# Patient Record
Sex: MALE | Employment: STUDENT | URBAN - METROPOLITAN AREA
[De-identification: names, ages, dates, MRNs, and addresses within clinical notes are randomized per-mention and may not be internally consistent; named-entity substitution may affect disease eponyms.]

---
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
## 2022-07-26 ENCOUNTER — OFFICE VISIT (OUTPATIENT)
Dept: URGENT CARE | Age: 22
End: 2022-07-26

## 2022-11-16 ENCOUNTER — TELEPHONE (OUTPATIENT)
Dept: PEDIATRICS CLINIC | Facility: CLINIC | Age: 22
End: 2022-11-16

## 2022-11-23 NOTE — TELEPHONE ENCOUNTER
11/23/22 11:10 AM        The office's request has been received, reviewed, and the patient chart updated  The PCP has successfully been removed with a patient attribution note  This message will now be completed          Thank you  Dez Ambrose

## 2023-01-03 ENCOUNTER — HOSPITAL ENCOUNTER (EMERGENCY)
Facility: HOSPITAL | Age: 23
Discharge: HOME | End: 2023-01-03
Attending: EMERGENCY MEDICINE

## 2023-01-03 VITALS
OXYGEN SATURATION: 97 % | HEART RATE: 105 BPM | SYSTOLIC BLOOD PRESSURE: 164 MMHG | TEMPERATURE: 98.3 F | RESPIRATION RATE: 17 BRPM | DIASTOLIC BLOOD PRESSURE: 92 MMHG

## 2023-01-03 DIAGNOSIS — J02.9 SORE THROAT: ICD-10-CM

## 2023-01-03 DIAGNOSIS — R19.7 DIARRHEA: ICD-10-CM

## 2023-01-03 DIAGNOSIS — B34.9 VIRAL SYNDROME: Primary | ICD-10-CM

## 2023-01-03 LAB
FLUAV RNA RESP QL NAA+PROBE: NEGATIVE
FLUBV RNA RESP QL NAA+PROBE: NEGATIVE
RSV RNA RESP QL NAA+PROBE: NEGATIVE
SARS-COV-2 RNA RESP QL NAA+PROBE: NEGATIVE

## 2023-01-03 RX ORDER — ACETAMINOPHEN 325 MG/1
650 TABLET ORAL ONCE
Status: COMPLETED | OUTPATIENT
Start: 2023-01-03 | End: 2023-01-03

## 2023-01-03 RX ORDER — IBUPROFEN 600 MG/1
600 TABLET ORAL ONCE
Status: COMPLETED | OUTPATIENT
Start: 2023-01-03 | End: 2023-01-03

## 2023-01-03 RX ADMIN — IBUPROFEN 600 MG: 600 TABLET, FILM COATED ORAL at 22:40

## 2023-01-03 RX ADMIN — ACETAMINOPHEN 650 MG: 325 TABLET, FILM COATED ORAL at 22:40

## 2023-01-04 NOTE — DISCHARGE INSTRUCTIONS
You were seen in the emergency department for symptoms consistent with a viral infection  Your flu/COVID/RSV test was negative  Take Tylenol and Motrin for your symptoms  Schedule an appointment to follow-up with a primary care provider  If you have worsening symptoms, are unable to swallow, or become short of breath please return to the emergency department

## 2023-01-04 NOTE — ED PROVIDER NOTES
History  Chief Complaint   Patient presents with   • Sore Throat     Pt reports sore throat and lightheadedness when he woke up at 1300  Pt  Reports nausea and diarrhea  Pt reports taking tylenol around 65       82-year-old male with no significant past medical history who presents with sore throat, sinus congestion, and diarrhea that began when he woke up around 1300 today  Patient states that he has pain with swallowing but has been able to swallow and has been eating and drinking  Patient states that he has has mild nausea but has not vomited  The patient denies any blood in his stool  The patient states that he has felt warm but did not have a fever when he checked at home  The patient also complains of a dull right-sided headache  He states that he is not aware of any recent sick contacts  The patient states that he took Tylenol around 4 PM, but has not taken anything since  The patient denies ear pain, cough, chest pain, shortness of breath, abdominal pain  Prior to Admission Medications   Prescriptions Last Dose Informant Patient Reported? Taking? Cholecalciferol (D 1000) 25 MCG (1000 UT) tablet   No No   Sig: Take 1 tablet (1,000 Units total) by mouth daily   PREVIDENT 5000 BOOSTER PLUS 1 1 % PSTE   Yes No   atomoxetine (STRATTERA) 10 MG capsule   Yes No   fluticasone (FLONASE) 50 mcg/act nasal spray   No No   Si spray into each nostril daily   loratadine (CLARITIN) 10 mg tablet   No No   Sig: Take 1 tablet (10 mg total) by mouth daily      Facility-Administered Medications: None       History reviewed  No pertinent past medical history  Past Surgical History:   Procedure Laterality Date   • DENTAL SURGERY         Family History   Problem Relation Age of Onset   • Lupus Mother    • Leukemia Mother    • Heart murmur Mother    • Panic disorder Father      I have reviewed and agree with the history as documented      E-Cigarette/Vaping     E-Cigarette/Vaping Substances     Social History     Tobacco Use   • Smoking status: Never   • Smokeless tobacco: Never   Substance Use Topics   • Alcohol use: Yes     Comment: only at a party he tried alcohol   • Drug use: No     Comment: stopped smoking , been clean for 9 months        Review of Systems    Physical Exam  ED Triage Vitals   Temperature Pulse Respirations Blood Pressure SpO2   01/03/23 2107 01/03/23 2107 01/03/23 2107 01/03/23 2107 01/03/23 2107   98 3 °F (36 8 °C) 105 17 164/92 97 %      Temp Source Heart Rate Source Patient Position - Orthostatic VS BP Location FiO2 (%)   01/03/23 2107 01/03/23 2107 01/03/23 2107 01/03/23 2107 --   Oral Monitor Sitting Right arm       Pain Score       01/03/23 2240       Med Not Given for Pain - for MAR use only             Orthostatic Vital Signs  Vitals:    01/03/23 2107   BP: 164/92   Pulse: 105   Patient Position - Orthostatic VS: Sitting       Physical Exam  Constitutional:       General: He is not in acute distress  Appearance: Normal appearance  He is not ill-appearing, toxic-appearing or diaphoretic  HENT:      Head: Normocephalic and atraumatic  Nose: Nose normal       Mouth/Throat:      Mouth: Mucous membranes are moist  No oral lesions  Pharynx: Oropharynx is clear  Uvula midline  No pharyngeal swelling, oropharyngeal exudate, posterior oropharyngeal erythema or uvula swelling  Tonsils: No tonsillar exudate or tonsillar abscesses  0 on the right  0 on the left  Eyes:      General: No scleral icterus  Right eye: No discharge  Left eye: No discharge  Conjunctiva/sclera: Conjunctivae normal       Pupils: Pupils are equal, round, and reactive to light  Cardiovascular:      Rate and Rhythm: Normal rate and regular rhythm  Pulses: Normal pulses  Heart sounds: Normal heart sounds  No murmur heard  No friction rub  No gallop  Pulmonary:      Effort: Pulmonary effort is normal       Breath sounds: Normal breath sounds   No wheezing, rhonchi or rales    Abdominal:      General: Abdomen is flat  Palpations: Abdomen is soft  Tenderness: There is no abdominal tenderness  There is no guarding or rebound  Musculoskeletal:         General: No swelling or tenderness  Normal range of motion  Cervical back: Normal range of motion and neck supple  No rigidity or tenderness  Right lower leg: No edema  Left lower leg: No edema  Lymphadenopathy:      Cervical: No cervical adenopathy  Skin:     General: Skin is warm and dry  Capillary Refill: Capillary refill takes less than 2 seconds  Coloration: Skin is not jaundiced or pale  Findings: No bruising, erythema, lesion or rash  Neurological:      General: No focal deficit present  Mental Status: He is alert and oriented to person, place, and time  ED Medications  Medications   ibuprofen (MOTRIN) tablet 600 mg (600 mg Oral Given 1/3/23 2240)   acetaminophen (TYLENOL) tablet 650 mg (650 mg Oral Given 1/3/23 2240)       Diagnostic Studies  Results Reviewed     Procedure Component Value Units Date/Time    COVID/FLU/RSV [[id number]]  (Normal) Collected: 01/03/23 2109    Lab Status: Final result Specimen: Nares from Nose Updated: 01/03/23 2204     SARS-CoV-2 Negative     INFLUENZA A PCR Negative     INFLUENZA B PCR Negative     RSV PCR Negative    Narrative:      FOR PEDIATRIC PATIENTS - copy/paste COVID Guidelines URL to browser: [email or web address] [email or web address]  [employer]    SARS-CoV-2 assay is a Nucleic Acid Amplification assay intended for the  qualitative detection of nucleic acid from SARS-CoV-2 in nasopharyngeal  swabs  Results are for the presumptive identification of SARS-CoV-2 RNA  Positive results are indicative of infection with SARS-CoV-2, the virus  causing COVID-19, but do not rule out bacterial infection or co-infection  with other viruses   Laboratories within the [place] and its  territories are required to report all positive results to the appropriate  public health authorities  Negative results do not preclude SARS-CoV-2  infection and should not be used as the sole basis for treatment or other  patient management decisions  Negative results must be combined with  clinical observations, patient history, and epidemiological information  This test has not been FDA cleared or approved  This test has been authorized by FDA under an Emergency Use Authorization  (EUA)  This test is only authorized for the duration of time the  declaration that circumstances exist justifying the authorization of the  emergency use of an in vitro diagnostic tests for detection of SARS-CoV-2  virus and/or diagnosis of COVID-19 infection under section 564(b)(1) of  the Act, 21 U  [place]  644ILD-7(M)(5), unless the authorization is terminated  or revoked sooner  The test has been validated but independent review by FDA  and CLIA is pending  Test performed using [employer] GeneXpert: This RT-PCR assay targets N2,  a region unique to SARS-CoV-2  A conserved region in the E-gene was chosen  for pan-Sarbecovirus detection which includes SARS-CoV-2  According to CMS-2020-01-R, this platform meets the definition of high-throughput technology  No orders to display         Procedures  Procedures      ED Course                                       Medical Decision Making  82-year-old male with no significant past medical history who presents with sore throat, sinus congestion, and diarrhea that began when he woke up around 1300 today  Vitals are within the normal limits  On exam the patient is well-appearing, oropharynx is nonerythematous with no tonsillar swelling or exudates, uvula is midline, no anterior cervical lymphadenopathy, heart is regular rate and rhythm, lungs are clear to auscultation bilaterally, abdomen is soft and nontender  Suspect viral URI    The patient has no signs of a peritonsillar abscess on exam   The patient's Centor score is 0 making strep pharyngitis unlikely  Flu/COVID/RSV test was ordered by nursing staff and came back negative  We will treat the patient symptoms with Tylenol and Motrin  The patient is instructed to continue take Tylenol and Motrin at home  The patient is instructed to follow-up with primary care provider  Return precautions are given and the patient is discharged  Disposition  Final diagnoses:   Sore throat   Viral syndrome   Diarrhea     Time reflects when diagnosis was documented in both MDM as applicable and the Disposition within this note     Time User Action Codes Description Comment    1/3/2023 10:43 PM Karlyne Yadira A Add [J02 9] Sore throat     1/3/2023 10:43 PM Karlyne Yadira A Add [B34 9] Viral syndrome     1/3/2023 10:43 PM Karlyne Yadira A Modify [J02 9] Sore throat     1/3/2023 10:43 PM Karlyne Villa Calma A Modify [B34 9] Viral syndrome     1/3/2023 10:43 PM Lawyer Peres Add [R19 7] Diarrhea       ED Disposition     ED Disposition   Discharge    Condition   Stable    Date/Time   Tue Pacheco 3, 2023 10:43 PM    Comment   Karishma López discharge to home/self care                 Follow-up Information     Follow up With Specialties Details Why Contact Info Additional [address] Emergency Department Emergency Medicine Go to  If symptoms worsen [address] [id number]  [address] [place] [address] Emergency Department, [address], [address] [facility] Internal Medicine Schedule an appointment as soon as possible for a visit  As needed Andrew  [address] [facility] [id number]  [facility] [address] [address], [place], [place], [address] [phone]          Patient's Medications   Discharge Prescriptions    No medications on file     No discharge procedures on file  PDMP Review     None           ED Provider  Attending physically available and evaluated Collettbetty Mary PADRON managed the patient along with the ED Attending      Electronically Signed by         Vickey Reagan DO  01/03/23 1286

## 2023-01-06 ENCOUNTER — HOSPITAL ENCOUNTER (EMERGENCY)
Facility: HOSPITAL | Age: 23
Discharge: HOME | End: 2023-01-06
Attending: EMERGENCY MEDICINE

## 2023-01-06 VITALS
HEIGHT: 64 IN | SYSTOLIC BLOOD PRESSURE: 136 MMHG | RESPIRATION RATE: 18 BRPM | TEMPERATURE: 97.5 F | BODY MASS INDEX: 28 KG/M2 | HEART RATE: 99 BPM | DIASTOLIC BLOOD PRESSURE: 88 MMHG | OXYGEN SATURATION: 97 % | WEIGHT: 164 LBS

## 2023-01-06 DIAGNOSIS — J02.9 PHARYNGITIS: Primary | ICD-10-CM

## 2023-01-06 RX ADMIN — DEXAMETHASONE 6 MG: 4 TABLET ORAL at 18:28

## 2023-01-06 NOTE — Clinical Note
Victor Manuel Soni was seen and treated in our emergency department on 1/6/2023  Diagnosis:     Karishma  may return to work on return date  He may return on this date: 01/08/2023         If you have any questions or concerns, please don't hesitate to call        Timo Nicolas MD    ______________________________           _______________          _______________  Hospital Representative                              Date                                Time

## 2023-01-06 NOTE — Clinical Note
Sofia Christina was seen and treated in our emergency department on 1/6/2023  Diagnosis:     Karishma    He may return on this date: 01/08/2023         If you have any questions or concerns, please don't hesitate to call        Elba Andujar DO    ______________________________           _______________          _______________  Hospital Representative                              Date                                Time

## 2023-01-06 NOTE — Clinical Note
Stephaniesheri Griffiths was seen and treated in our emergency department on 1/6/2023  Diagnosis:     Karishma  may return to work on return date  He may return on this date: If you have any questions or concerns, please don't hesitate to call        Gary Chacon MD    ______________________________           _______________          _______________  Hospital Representative                              Date                                Time

## 2023-01-07 NOTE — ED ATTENDING ATTESTATION
1/6/2023   ITye MD, saw and evaluated the patient  I have discussed the patient with the resident/non-physician practitioner and agree with the resident's/non-physician practitioner's findings, Plan of Care, and MDM as documented in the resident's/non-physician practitioner's note, except where noted  All available labs and Radiology studies were reviewed  I was present for key portions of any procedure(s) performed by the resident/non-physician practitioner and I was immediately available to provide assistance  At this point I agree with the current assessment done in the Emergency Department  I have conducted an independent evaluation of this patient a history and physical is as follows:    Chief Complaint   Patient presents with   • Sore Throat     Sore throat since Tuesday  Seen here Tuesday for same  72-year-old male with no significant past medical history presenting with sore throat, congestion, as well as pain with swallowing  Symptoms started 4 days ago  Patient was seen in our emergency department for the symptoms on 1/3  He reports that he initially had fevers but they have not resolved  He predominantly has sore throat and pain with swallowing  He has been using salt gargles and has been taking Tylenol for his symptoms, however, symptoms persist   He notes hoarse voice  There is a mild cough but no shortness of breath  No other symptoms  Patient has a history of prior tonsillectomy  Physical Exam  /88   Pulse 99   Temp 97 5 °F (36 4 °C) (Tympanic)   Resp 18   Ht 5' 4" (1 626 m)   Wt 74 4 kg (164 lb)   SpO2 97%   BMI 28 15 kg/m²      Vital signs and nursing notes reviewed    CONSTITUTIONAL: male appearing stated age resting in bed, in no acute distress  HEENT: atraumatic, normocephalic  Sclera anicteric, conjunctiva are not injected  Hoarse voice is present  Mild posterior oropharyngeal erythema involving predominantly soft palate and uvula    Tonsils are surgically absent  Moist oral mucosa  CARDIOVASCULAR/CHEST: RRR, no M/R/G  2+ radial pulses  PULMONARY: Breathing comfortably on RA  Breath sounds are equal and clear to auscultation  ABDOMEN: non-distended  MSK: moves all extremities, no deformities, no peripheral edema, no calf asymmetry  NEURO: Awake, alert, and oriented x 3  Face symmetric  Moves all extremities spontaneously  No focal neurologic deficits  SKIN: Warm, appears well-perfused  MENTAL STATUS: Normal affect      Labs and Imaging  Labs Reviewed   COVID19, INFLUENZA A/B, RSV PCR, [facility] - Normal       Result Value Ref Range Status    SARS-CoV-2 Negative  Negative Final    Comment:      INFLUENZA A PCR Negative  Negative Final    Comment:      INFLUENZA B PCR Negative  Negative Final    Comment:      RSV PCR Negative  Negative Final    Comment:      Narrative:     FOR PEDIATRIC PATIENTS - copy/paste COVID Guidelines URL to browser: [email or web address]  [employer]    SARS-CoV-2 assay is a Nucleic Acid Amplification assay intended for the  qualitative detection of nucleic acid from SARS-CoV-2 in nasopharyngeal  swabs  Results are for the presumptive identification of SARS-CoV-2 RNA  Positive results are indicative of infection with SARS-CoV-2, the virus  causing COVID-19, but do not rule out bacterial infection or co-infection  with other viruses  Laboratories within the [place] and its  territories are required to report all positive results to the appropriate  public health authorities  Negative results do not preclude SARS-CoV-2  infection and should not be used as the sole basis for treatment or other  patient management decisions  Negative results must be combined with  clinical observations, patient history, and epidemiological information  This test has not been FDA cleared or approved  This test has been authorized by FDA under an Emergency Use Authorization  (EUA)   This test is only authorized for the duration of time the  declaration that circumstances exist justifying the authorization of the  emergency use of an in vitro diagnostic tests for detection of SARS-CoV-2  virus and/or diagnosis of COVID-19 infection under section 564(b)(1) of  the Act, 21 U  S C  487SZL-6(T)(8), unless the authorization is terminated  or revoked sooner  The test has been validated but independent review by FDA  and CLIA is pending  Test performed using [employer] GeneXpert: This RT-PCR assay targets N2,  a region unique to SARS-CoV-2  A conserved region in the E-gene was chosen  for pan-Sarbecovirus detection which includes SARS-CoV-2  According to CMS-2020-01-R, this platform meets the definition of high-throughput technology  No orders to display         Procedures  Procedures        ED Course  Medications   dexamethasone (DECADRON) tablet 6 mg (6 mg Oral Given 1/6/23 1828)     23 y o  male presenting with hoarse voice, sore throat, and congestion  VS reviewed, afebrile, within normal limits  Differential diagnosis includes upper respiratory infection due to viral illness including due to Covid-19, streptococcal pharyngitis, acute otitis media, bronchitis, pneumonia, versus another etiology of symptoms  History and physical exam are not consistent with strep throat, or pneumonia  Medical record reviewed, including most recent visit on 1/3  COVID-19/influenza/RSV PCR is negative  Physical exam is not consistent with streptococcal pharyngitis  We will administer dose of Decadron for pharyngitis and recommend continuing with current treatments including analgesics such as Tylenol and/or ibuprofen for throat pain, as well as salt gargles  Will prescribe Magic mouthwash for symptomatic relief of pharyngitis  Seek medical attention if difficulty breathing, unable to keep anything down by mouth, dehydration, persistent chest pain, or as needed   Follow up with PCP in 1-3 days for re-evaluation of symptoms  Patient discharged to home with recommendations for symptom control, return precautions, and plan for follow up

## 2023-01-07 NOTE — ED PROVIDER NOTES
History  Chief Complaint   Patient presents with   • Sore Throat     Sore throat since Tuesday  Seen here Tuesday for same  Patient is a 72-year-old male with no significant past medical history presenting to the emergency department with sore throat and congestion over the past couple of days  Patient states his symptoms started about 4 days ago  Patient has tried over-the-counter medications on the alleviation of his symptoms  Patient states that he was seen earlier in the week with similar symptoms and states that his throat has been getting worse  Patient states he has pain with swallowing  Patient's mother accompanies him at bedside who states that his voice sounds similar to normal   The patient denies any headache, dizziness, tinnitus, difficulties breathing, chest pain, shortness of breath, nausea, vomiting, diarrhea, constipation, dysuria, hematuria  Prior to Admission Medications   Prescriptions Last Dose Informant Patient Reported? Taking? Cholecalciferol (D 1000) 25 MCG (1000 UT) tablet   No No   Sig: Take 1 tablet (1,000 Units total) by mouth daily   PREVIDENT 5000 BOOSTER PLUS 1 1 % PSTE   Yes No   atomoxetine (STRATTERA) 10 MG capsule   Yes No   fluticasone (FLONASE) 50 mcg/act nasal spray   No No   Si spray into each nostril daily   loratadine (CLARITIN) 10 mg tablet   No No   Sig: Take 1 tablet (10 mg total) by mouth daily      Facility-Administered Medications: None       History reviewed  No pertinent past medical history  Past Surgical History:   Procedure Laterality Date   • DENTAL SURGERY         Family History   Problem Relation Age of Onset   • Lupus Mother    • Leukemia Mother    • Heart murmur Mother    • Panic disorder Father      I have reviewed and agree with the history as documented      E-Cigarette/Vaping     E-Cigarette/Vaping Substances     Social History     Tobacco Use   • Smoking status: Never   • Smokeless tobacco: Never   Substance Use Topics   • Alcohol use: Yes     Comment: only at a party he tried alcohol   • Drug use: No     Comment: stopped smoking , been clean for 9 months        Review of Systems   Constitutional: Positive for fatigue  Negative for activity change, chills and fever  HENT: Positive for congestion and sore throat  Negative for ear pain, nosebleeds, postnasal drip, tinnitus and trouble swallowing  Eyes: Negative for photophobia, pain and visual disturbance  Respiratory: Negative for cough, shortness of breath and stridor  Cardiovascular: Negative for chest pain and palpitations  Gastrointestinal: Negative for abdominal pain, constipation, diarrhea, nausea and vomiting  Genitourinary: Negative for dysuria and hematuria  Musculoskeletal: Negative for arthralgias and back pain  Skin: Negative for color change and rash  Neurological: Negative for dizziness, seizures, syncope, weakness, numbness and headaches  Psychiatric/Behavioral: Negative for agitation and behavioral problems  All other systems reviewed and are negative  Physical Exam  ED Triage Vitals [01/06/23 1703]   Temperature Pulse Respirations Blood Pressure SpO2   97 5 °F (36 4 °C) 99 18 136/88 97 %      Temp Source Heart Rate Source Patient Position - Orthostatic VS BP Location FiO2 (%)   Tympanic -- -- -- --      Pain Score       --             Orthostatic Vital Signs  Vitals:    01/06/23 1703   BP: 136/88   Pulse: 99       Physical Exam  Vitals and nursing note reviewed  Constitutional:       General: He is not in acute distress  Appearance: He is well-developed and normal weight  HENT:      Head: Normocephalic and atraumatic  Right Ear: Tympanic membrane and ear canal normal       Left Ear: Tympanic membrane and ear canal normal       Mouth/Throat:      Mouth: Mucous membranes are moist       Pharynx: Uvula midline  Posterior oropharyngeal erythema present  No oropharyngeal exudate or uvula swelling        Tonsils: No tonsillar exudate or tonsillar abscesses  Eyes:      Conjunctiva/sclera: Conjunctivae normal    Cardiovascular:      Rate and Rhythm: Normal rate and regular rhythm  Heart sounds: No murmur heard  Pulmonary:      Effort: Pulmonary effort is normal  No respiratory distress  Breath sounds: Normal breath sounds  Abdominal:      Palpations: Abdomen is soft  Tenderness: There is no abdominal tenderness  Musculoskeletal:         General: No swelling  Cervical back: Normal range of motion and neck supple  Skin:     General: Skin is warm and dry  Capillary Refill: Capillary refill takes less than 2 seconds  Neurological:      General: No focal deficit present  Mental Status: He is alert and oriented to person, place, and time  Psychiatric:         Mood and Affect: Mood normal          ED Medications  Medications   dexamethasone (DECADRON) tablet 6 mg (6 mg Oral Given 1/6/23 1828)       Diagnostic Studies  Results Reviewed     Procedure Component Value Units Date/Time    FLU/RSV/COVID - if FLU/RSV clinically relevant [[id number]]  (Normal) Collected: 01/06/23 1708    Lab Status: Final result Specimen: Nares from Nose Updated: 01/06/23 1757     SARS-CoV-2 Negative     INFLUENZA A PCR Negative     INFLUENZA B PCR Negative     RSV PCR Negative    Narrative:      FOR PEDIATRIC PATIENTS - copy/paste COVID Guidelines URL to browser: [email or web address]    SARS-CoV-2 assay is a Nucleic Acid Amplification assay intended for the  qualitative detection of nucleic acid from SARS-CoV-2 in nasopharyngeal  swabs  Results are for the presumptive identification of SARS-CoV-2 RNA  Positive results are indicative of infection with SARS-CoV-2, the virus  causing COVID-19, but do not rule out bacterial infection or co-infection  with other viruses   Laboratories within the [place] and its  territories are required to report all positive results to the appropriate  public health authorities  Negative results do not preclude SARS-CoV-2  infection and should not be used as the sole basis for treatment or other  patient management decisions  Negative results must be combined with  clinical observations, patient history, and epidemiological information  This test has not been FDA cleared or approved  This test has been authorized by FDA under an Emergency Use Authorization  (EUA)  This test is only authorized for the duration of time the  declaration that circumstances exist justifying the authorization of the  emergency use of an in vitro diagnostic tests for detection of SARS-CoV-2  virus and/or diagnosis of COVID-19 infection under section 564(b)(1) of  the Act, 21 U  S C  667IJI-8(L)(4), unless the authorization is terminated  or revoked sooner  The test has been validated but independent review by FDA  and CLIA is pending  Test performed using [employer] GeneXpert: This RT-PCR assay targets N2,  a region unique to SARS-CoV-2  A conserved region in the E-gene was chosen  for pan-Sarbecovirus detection which includes SARS-CoV-2  According to CMS-2020-01-R, this platform meets the definition of high-throughput technology  No orders to display         Procedures  Procedures      ED Course                                       Medical Decision Making  Patient is a 26-year-old male presenting to the emergency department for evaluation of sore throat and sinus congestion  Vitals are within the normal limits  On exam the patient is well-appearing, oropharynx is erythematous with no tonsillar swelling or exudates, uvula is midline, no anterior cervical lymphadenopathy, heart is regular rate and rhythm, lungs are clear to auscultation bilaterally, abdomen is soft and nontender  Suspect viral URI  The patient has no signs of a peritonsillar abscess on exam   We will treat patient's symptoms with Decadron    The patient is instructed to continue take Tylenol and Motrin at home  The patient is instructed to follow-up with primary care provider  Patient is advised to continue over-the-counter medications for symptomatic relief  Advised to come back to the hospital if develops any new, worsening or concerning symptoms  Return precautions are given and the patient is discharged  Patient stable for discharge    Pharyngitis: acute illness or injury        Disposition  Final diagnoses:   Pharyngitis     Time reflects when diagnosis was documented in both MDM as applicable and the Disposition within this note     Time User Action Codes Description Comment    1/6/2023  6:19 PM Carina Rios Add [J02 9] Pharyngitis       ED Disposition     ED Disposition   Discharge    Condition   Stable    Date/Time   Fri Jan 6, 2023  6:19 PM    Comment   Denny Lion discharge to home/self care                 Follow-up Information     Follow up With Specialties Details Why Contact Info Additional [address] [facility] Emergency Medicine Go to  As needed, If symptoms worsen [address] [phone]  [address] Emergency Department, [address] [place] [address], [address] [place] [address] Medicine Schedule an appointment as soon as possible for a visit  for follow up [address], [place] [phone]  [address], [address], [address] [place], [place], [address] [place] [address]          Discharge Medication List as of 1/6/2023  6:39 PM      START taking these medications    Details   al mag oxide-diphenhydramine-lidocaine viscous (MAGIC MOUTHWASH) 1:1:1 suspension Swish and swallow 10 mL every 4 (four) hours as needed for mouth pain or discomfort for up to 3 days, Starting Fri 1/6/2023, Until Mon 1/9/2023 at 2359, Normal CONTINUE these medications which have NOT CHANGED    Details   atomoxetine (STRATTERA) 10 MG capsule Starting Tue 11/19/2019, Historical Med      Cholecalciferol (D 1000) 25 MCG (1000 UT) tablet Take 1 tablet (1,000 Units total) by mouth daily, Starting Wed 12/23/2020, Normal      fluticasone (FLONASE) 50 mcg/act nasal spray 1 spray into each nostril daily, Starting Wed 12/23/2020, Normal      loratadine (CLARITIN) 10 mg tablet Take 1 tablet (10 mg total) by mouth daily, Starting Wed 12/23/2020, Until Sun 9/19/2021, Normal      PREVIDENT 5000 BOOSTER PLUS 1 1 % PSTE Starting Mon 11/26/2018, Historical Med           No discharge procedures on file  PDMP Review     None           ED Provider  Attending physically available and evaluated Nay Johnson I managed the patient along with the ED Attending      Electronically Signed by         Kavon Langley DO  01/06/23 2980

## 2023-01-08 NOTE — ED ATTENDING ATTESTATION
1/3/2023  IStuart DO, saw and evaluated the patient  I have discussed the patient with the resident/non-physician practitioner and agree with the resident's/non-physician practitioner's findings, Plan of Care, and MDM as documented in the resident's/non-physician practitioner's note, except where noted  All available labs and Radiology studies were reviewed  I was present for key portions of any procedure(s) performed by the resident/non-physician practitioner and I was immediately available to provide assistance  At this point I agree with the current assessment done in the Emergency Department  I have conducted an independent evaluation of this patient a history and physical is as follows:    23 yom with uri sx, diarrhea, epigastric pain ongoing for a few days  Normal exam      A/P: viral syndrome  -check covid flu rsv     Supportive care      ED Course         Critical Care Time  Procedures

## 2023-03-22 ENCOUNTER — OFFICE VISIT (OUTPATIENT)
Dept: FAMILY MEDICINE CLINIC | Facility: CLINIC | Age: 23
End: 2023-03-22

## 2023-03-22 VITALS
TEMPERATURE: 98.2 F | HEIGHT: 65 IN | BODY MASS INDEX: 27.86 KG/M2 | WEIGHT: 167.2 LBS | HEART RATE: 76 BPM | OXYGEN SATURATION: 100 % | RESPIRATION RATE: 16 BRPM | DIASTOLIC BLOOD PRESSURE: 77 MMHG | SYSTOLIC BLOOD PRESSURE: 122 MMHG

## 2023-03-22 DIAGNOSIS — F90.2 ATTENTION DEFICIT HYPERACTIVITY DISORDER (ADHD), COMBINED TYPE: ICD-10-CM

## 2023-03-22 DIAGNOSIS — Z11.4 ENCOUNTER FOR SCREENING FOR HIV: ICD-10-CM

## 2023-03-22 DIAGNOSIS — Z11.59 ENCOUNTER FOR HEPATITIS C SCREENING TEST FOR LOW RISK PATIENT: ICD-10-CM

## 2023-03-22 DIAGNOSIS — Z00.00 ANNUAL PHYSICAL EXAM: Primary | ICD-10-CM

## 2023-03-22 DIAGNOSIS — J30.1 NON-SEASONAL ALLERGIC RHINITIS DUE TO POLLEN: ICD-10-CM

## 2023-03-22 DIAGNOSIS — M79.604 RIGHT LEG PAIN: ICD-10-CM

## 2023-03-22 DIAGNOSIS — B20 HIV INFECTION, UNSPECIFIED SYMPTOM STATUS (HCC): ICD-10-CM

## 2023-03-22 RX ORDER — ATOMOXETINE 40 MG/1
40 CAPSULE ORAL DAILY
Qty: 30 CAPSULE | Refills: 0 | Status: SHIPPED | OUTPATIENT
Start: 2023-03-22

## 2023-03-22 NOTE — PROGRESS NOTES
[address] [facility]    NAME: Srinath Morrison  AGE: 23 y o  SEX: male  : 2000     DATE: 3/22/2023     Assessment and Plan:     Problem List Items Addressed This Visit        Respiratory    Allergic rhinitis       Other    ADHD (attention deficit hyperactivity disorder)     Previously followed by psychiatry (Dr Aditya Ray at [facility]), but no longer following  Per chart review, was previously on methylphenidate 27 mg and later transitioned to atomoxetine 10 mg daily  Patient reports at that time, he was not very good at taking medications and so this was stopped  Currently reports difficulty concentrating/focusing, restlessness, memory issues that make it difficult during work and to maintain relationships  Is willing to try medical therapy -we will try starting with atomoxetine 40 mg daily  Discussed behavioral therapy option -patient defers at this time         Relevant Medications    atoMOXetine (STRATTERA) 40 mg capsule    Right leg pain     Reports 1 year history of worsening right calf pain/spasms? Experiences numbness, sensation of leg falling asleep and pain both during rest and activity  Has been occurring more frequently and walking seems to make it go better  No recent trauma although patient is wondering if it is related to him walking on his tiptoes  Unremarkable physical exam, normal gait  · Differential includes muscle spasms versus restless leg syndrome  · We will order CBC and CMP  · Can consider adding muscle relaxants if there is no improvement and labs return normal         Relevant Orders    CBC and differential    Comprehensive metabolic panel    Annual physical exam - Primary     53-year-old male with history of ADHD, learning disability, oppositional defiant disorder, migraines presenting to establish care    · Ordered HIV, hepatitis C labs  · Refills provided  · Follow-up in 4 weeks for ADHD, right leg pain          Other Visit Diagnoses     HIV infection, unspecified symptom status ([facility] [address] )        Encounter for screening for HIV        Relevant Orders    : HIV 1/2 AB/AG w Reflex [facility] for 2 yr old and above    Encounter for hepatitis C screening test for low risk patient        Relevant Orders    Hepatitis C antibody          Immunizations and preventive care screenings were discussed with patient today  Appropriate education was printed on patient's after visit summary  Counseling:  Alcohol/drug use: discussed moderation in alcohol intake, the recommendations for healthy alcohol use, and avoidance of illicit drug use  Dental Health: discussed importance of regular tooth brushing, flossing, and dental visits  Injury prevention: discussed safety/seat belts, safety helmets, smoke detectors, carbon dioxide detectors, and smoking near bedding or upholstery  · Exercise: the importance of regular exercise/physical activity was discussed  Recommend exercise 3-5 times per week for at least 30 minutes  BMI Counseling: Body mass index is 28 26 kg/m²  The BMI is above normal  Nutrition recommendations include decreasing portion sizes, encouraging healthy choices of fruits and vegetables, decreasing fast food intake, limiting drinks that contain sugar, moderation in carbohydrate intake and reducing intake of cholesterol  Exercise recommendations include moderate physical activity 150 minutes/week and exercising 3-5 times per week  Rationale for BMI follow-up plan is due to patient being overweight or obese  Depression Screening and Follow-up Plan: Patient was screened for depression during today's encounter  They screened negative with a PHQ-2 score of 0  Return in about 4 weeks (around 4/19/2023) for please schedule same date with grandmother (f/u right leg pain)       Chief Complaint:     Chief Complaint   Patient presents with   • Physical Exam   • Establish Care      History of Present Illness:     Adult Annual Physical   Patient here for a comprehensive physical exam  The patient reports problems - right calf pain/spasms  Grandmother is also present to provide additional history  Patient wanted to discuss his right leg pain, which has been occurring for the past year  Pain is located in the right calf extending from his knee down to his toes  He reports it feels like his legs fall asleep and he develops numbness/pain  It occurs during both rest and activity although he likes to walk to make it go away  Its been occurring more frequently and these episodes tend to last maybe a minute and go away  Denies any recent trauma although he is wondering if this is related to him walking on his tiptoes  He states that when he was younger, he used to always walk on his tiptoes and he is so used to doing this even now  Asked if he develops the pain when he does this, but patient states he walks on his tiptoes unconsciously sometimes so he is not really sure  He also reports doing mixed martial arts when he was younger in middle school  There may have been an incident where he collided with his sparring partner and he needed physical therapy  Grandmother was also requesting labs for diabetes, and to diagnose lupus  Patient's mother was diagnosed with lupus but the grandmother reports that she was diagnosed so late in her life  Unfortunately, the mother  back in  from a bone marrow transfusion? The patient does not currently have any signs or symptoms concerning for lupus so testing was deferred  Diet and Physical Activity  · Diet/Nutrition: frequent junk food and high fat diet  · Exercise: no formal exercise        Depression Screening  PHQ-2/9 Depression Screening    Little interest or pleasure in doing things: 0 - not at all  Feeling down, depressed, or hopeless: 0 - not at all  PHQ-2 Score: 0  PHQ-2 Interpretation: Negative depression screen       General Health  · Sleep: sleeps poorly and gets 4-6 hours of sleep on average  · Hearing: normal - bilateral    · Vision: no vision problems and has glasses ordered  · Dental: brushes teeth twice daily   Health  · History of STDs?: no      Review of Systems:     Review of Systems   Constitutional: Negative for chills and fever  HENT: Negative for ear pain and sore throat  Eyes: Negative for pain and visual disturbance  Respiratory: Negative for cough and shortness of breath  Cardiovascular: Negative for chest pain and palpitations  Gastrointestinal: Negative for abdominal pain and vomiting  Genitourinary: Negative for dysuria and hematuria  Musculoskeletal: Positive for myalgias  Negative for arthralgias and back pain  Skin: Negative for color change and rash  Neurological: Negative for seizures and syncope  Psychiatric/Behavioral: Positive for decreased concentration  Negative for suicidal ideas  The patient is hyperactive  All other systems reviewed and are negative  Past Medical History:     History reviewed  No pertinent past medical history     Past Surgical History:     Past Surgical History:   Procedure Laterality Date   • DENTAL SURGERY     • TONSILECTOMY AND ADNOIDECTOMY        Social History:     Social History     Socioeconomic History   • Marital status: Single     Spouse name: None   • Number of children: None   • Years of education: None   • Highest education level: None   Occupational History   • None   Tobacco Use   • Smoking status: Never   • Smokeless tobacco: Never   Vaping Use   • Vaping Use: Never used   Substance and Sexual Activity   • Alcohol use: Not Currently   • Drug use: Not Currently     Types: Marijuana   • Sexual activity: Never   Other Topics Concern   • None   Social History Narrative   • None     Social Determinants of Health     Financial Resource Strain: Low Risk    • Difficulty of Paying Living Expenses: Not hard at all   Food Insecurity: No Food Insecurity   • Worried About Running Out of Food in the Last Year: Never true   • Ran Out of Food in the Last Year: Never true   Transportation Needs: No Transportation Needs   • Lack of Transportation (Medical): No   • Lack of Transportation (Non-Medical): No   Physical Activity: Not on file   Stress: Not on file   Social Connections: Not on file   Intimate Partner Violence: Not on file   Housing Stability: Low Risk    • Unable to Pay for Housing in the Last Year: No   • Number of Places Lived in the Last Year: 1   • Unstable Housing in the Last Year: No      Family History:     Family History   Problem Relation Age of Onset   • Lupus Mother    • Leukemia Mother    • Heart murmur Mother    • Panic disorder Father       Current Medications:     Current Outpatient Medications   Medication Sig Dispense Refill   • atoMOXetine (STRATTERA) 40 mg capsule Take 1 capsule (40 mg total) by mouth daily 30 capsule 0   • fluticasone (FLONASE) 50 mcg/act nasal spray 1 spray into each nostril daily 16 g 5   • loratadine (CLARITIN) 10 mg tablet Take 1 tablet (10 mg total) by mouth daily 90 tablet 2     No current facility-administered medications for this visit  Allergies:     No Known Allergies   Physical Exam:     /77 (BP Location: Left arm, Patient Position: Sitting, Cuff Size: Standard)   Pulse 76   Temp 98 2 °F (36 8 °C) (Temporal)   Resp 16   Ht 5' 4 5" (1 638 m)   Wt 75 8 kg (167 lb 3 2 oz)   SpO2 100%   BMI 28 26 kg/m²     Physical Exam  Vitals reviewed  Constitutional:       General: He is not in acute distress  Appearance: He is well-developed  He is not ill-appearing  HENT:      Head: Normocephalic and atraumatic  Eyes:      Extraocular Movements: Extraocular movements intact  Conjunctiva/sclera: Conjunctivae normal    Cardiovascular:      Rate and Rhythm: Normal rate and regular rhythm  Heart sounds: No murmur heard    Pulmonary:      Effort: Pulmonary effort is normal  No respiratory distress  Breath sounds: Normal breath sounds  Abdominal:      Palpations: Abdomen is soft  Tenderness: There is no abdominal tenderness  Musculoskeletal:         General: No swelling, tenderness or signs of injury  Normal range of motion  Cervical back: Neck supple  Right lower leg: No edema  Left lower leg: No edema  Comments: No tenderness to palpation of right calf, sensation and strength intact  Skin:     General: Skin is warm and dry  Capillary Refill: Capillary refill takes less than 2 seconds  Neurological:      Mental Status: He is alert and oriented to person, place, and time     Psychiatric:         Mood and Affect: Mood normal           Traci Bagley MD   [address]

## 2023-03-23 NOTE — ASSESSMENT & PLAN NOTE
Reports 1 year history of worsening right calf pain/spasms? Experiences numbness, sensation of leg falling asleep and pain both during rest and activity  Has been occurring more frequently and walking seems to make it go better  No recent trauma although patient is wondering if it is related to him walking on his tiptoes  Unremarkable physical exam, normal gait  · Differential includes muscle spasms versus restless leg syndrome     · We will order CBC and CMP  · Can consider adding muscle relaxants if there is no improvement and labs return normal

## 2023-03-23 NOTE — ASSESSMENT & PLAN NOTE
Previously followed by psychiatry (Dr Richard Peterson at [facility]), but no longer following  Per chart review, was previously on methylphenidate 27 mg and later transitioned to atomoxetine 10 mg daily  Patient reports at that time, he was not very good at taking medications and so this was stopped  Currently reports difficulty concentrating/focusing, restlessness, memory issues that make it difficult during work and to maintain relationships     Is willing to try medical therapy -we will try starting with atomoxetine 40 mg daily  Discussed behavioral therapy option -patient defers at this time

## 2023-03-23 NOTE — ASSESSMENT & PLAN NOTE
53-year-old male with history of ADHD, learning disability, oppositional defiant disorder, migraines presenting to establish care    · Ordered HIV, hepatitis C labs  · Refills provided  · Follow-up in 4 weeks for ADHD, right leg pain

## 2023-03-24 ENCOUNTER — TELEPHONE (OUTPATIENT)
Dept: FAMILY MEDICINE CLINIC | Facility: CLINIC | Age: 23
End: 2023-03-24

## 2023-03-27 NOTE — TELEPHONE ENCOUNTER
He used to follow psychiatry (Dr Kenya Sparks at [facility]) and reported being diagnosed with ADHD as a child  Could we get him to sign a medical release form so we can see records from that psychiatrist? Thank you!

## 2023-03-27 NOTE — TELEPHONE ENCOUNTER
Received denial letter  Per insurance the doctor need to send patient symptoms to show that your diagnosis of ADHD was made using rules from the Diagnostic and Statistical Manual of Mental Health Disorder (DSM-5, a guide for mental health disorders)

## 2023-03-28 NOTE — TELEPHONE ENCOUNTER
Prior authorization form placed in blue folder in clerical area to be scanned into patient's chart  Prior authorization can be resubmitted once medical records are received

## 2023-05-22 LAB
ALBUMIN SERPL-MCNC: 4.7 G/DL (ref 3.6–5.1)
ALBUMIN/GLOB SERPL: 1.6 (CALC) (ref 1–2.5)
ALP SERPL-CCNC: 101 U/L (ref 36–130)
ALT SERPL-CCNC: 31 U/L (ref 9–46)
AST SERPL-CCNC: 27 U/L (ref 10–40)
BASOPHILS # BLD AUTO: 17 CELLS/UL (ref 0–200)
BASOPHILS NFR BLD AUTO: 0.3 %
BILIRUB SERPL-MCNC: 1 MG/DL (ref 0.2–1.2)
BUN SERPL-MCNC: 19 MG/DL (ref 7–25)
BUN/CREAT SERPL: NORMAL (CALC) (ref 6–22)
CALCIUM SERPL-MCNC: 9.7 MG/DL (ref 8.6–10.3)
CHLORIDE SERPL-SCNC: 103 MMOL/L (ref 98–110)
CO2 SERPL-SCNC: 27 MMOL/L (ref 20–32)
CREAT SERPL-MCNC: 0.92 MG/DL (ref 0.6–1.24)
EOSINOPHIL # BLD AUTO: 99 CELLS/UL (ref 15–500)
EOSINOPHIL NFR BLD AUTO: 1.7 %
ERYTHROCYTE [DISTWIDTH] IN BLOOD BY AUTOMATED COUNT: 12.4 % (ref 11–15)
GFR/BSA.PRED SERPLBLD CYS-BASED-ARV: 122 ML/MIN/1.73M2
GLOBULIN SER CALC-MCNC: 2.9 G/DL (CALC) (ref 1.9–3.7)
GLUCOSE SERPL-MCNC: 82 MG/DL (ref 65–99)
HCT VFR BLD AUTO: 46 % (ref 38.5–50)
HCV AB S/CO SERPL IA: 0.12
HCV AB SERPL QL IA: NORMAL
HGB BLD-MCNC: 15.9 G/DL (ref 13.2–17.1)
HIV 1+2 AB+HIV1 P24 AG SERPL QL IA: NORMAL
LYMPHOCYTES # BLD AUTO: 2210 CELLS/UL (ref 850–3900)
LYMPHOCYTES NFR BLD AUTO: 38.1 %
MCH RBC QN AUTO: 30.4 PG (ref 27–33)
MCHC RBC AUTO-ENTMCNC: 34.6 G/DL (ref 32–36)
MCV RBC AUTO: 88 FL (ref 80–100)
MONOCYTES # BLD AUTO: 499 CELLS/UL (ref 200–950)
MONOCYTES NFR BLD AUTO: 8.6 %
NEUTROPHILS # BLD AUTO: 2975 CELLS/UL (ref 1500–7800)
NEUTROPHILS NFR BLD AUTO: 51.3 %
PLATELET # BLD AUTO: 196 THOUSAND/UL (ref 140–400)
PMV BLD REES-ECKER: 12.4 FL (ref 7.5–12.5)
POTASSIUM SERPL-SCNC: 4.1 MMOL/L (ref 3.5–5.3)
PROT SERPL-MCNC: 7.6 G/DL (ref 6.1–8.1)
RBC # BLD AUTO: 5.23 MILLION/UL (ref 4.2–5.8)
SODIUM SERPL-SCNC: 140 MMOL/L (ref 135–146)
WBC # BLD AUTO: 5.8 THOUSAND/UL (ref 3.8–10.8)

## 2023-10-26 ENCOUNTER — HOSPITAL ENCOUNTER (EMERGENCY)
Facility: HOSPITAL | Age: 23
Discharge: HOME | End: 2023-10-26
Attending: EMERGENCY MEDICINE
Payer: COMMERCIAL

## 2023-10-26 VITALS
SYSTOLIC BLOOD PRESSURE: 130 MMHG | TEMPERATURE: 97.7 F | RESPIRATION RATE: 16 BRPM | HEART RATE: 72 BPM | DIASTOLIC BLOOD PRESSURE: 96 MMHG | OXYGEN SATURATION: 97 %

## 2023-10-26 DIAGNOSIS — H66.001 NON-RECURRENT ACUTE SUPPURATIVE OTITIS MEDIA OF RIGHT EAR WITHOUT SPONTANEOUS RUPTURE OF TYMPANIC MEMBRANE: Primary | ICD-10-CM

## 2023-10-26 PROCEDURE — 99284 EMERGENCY DEPT VISIT MOD MDM: CPT

## 2023-10-26 PROCEDURE — 99282 EMERGENCY DEPT VISIT SF MDM: CPT

## 2023-10-26 RX ORDER — AMOXICILLIN AND CLAVULANATE POTASSIUM 875; 125 MG/1; MG/1
1 TABLET, FILM COATED ORAL EVERY 12 HOURS
Qty: 14 TABLET | Refills: 0 | Status: SHIPPED | OUTPATIENT
Start: 2023-10-26 | End: 2023-11-02

## 2023-10-26 NOTE — DISCHARGE INSTRUCTIONS
Take Augmentin 2 times per day for 7 days to treat the ear infection. Warm compresses for further relief. Rotate Tylenol and Motrin every 3 hours for best relief. For example, take Motrin then in 3 hours take Tylenol then 3 hours Motrin, repeat. Maximum Tylenol daily: 4,000 mg. Maximum ibuprofen daily: 3,600 mg. Return to the ER for lethargy, passing out, chest pain, difficulty breathing, any worsening or concerning symptoms overall.

## 2023-10-26 NOTE — ED PROVIDER NOTES
History  Chief Complaint   Patient presents with    Earache     Pt woke up around 0400 with a R earache. Noted some blood on Qtip after cleaning ear. Took tylenol at 0500. Minor relief. Denies any cough, sore throat or congestion. Traci Hickman is a 26year old male with no pertinent PMHx presenting to the ED for right sided earache x 0400 this morning. Used a Q-Tip at the time and believes there was blood on it. Denies placing anything else in the ear, denies trauma. Denies feeling or hearing a pop. Took Tylenol with some minor relief of symptoms. Denies any other symptoms. Prior to Admission Medications   Prescriptions Last Dose Informant Patient Reported? Taking? atoMOXetine (STRATTERA) 40 mg capsule   No No   Sig: Take 1 capsule (40 mg total) by mouth daily   fluticasone (FLONASE) 50 mcg/act nasal spray   No No   Si spray into each nostril daily   loratadine (CLARITIN) 10 mg tablet   No No   Sig: Take 1 tablet (10 mg total) by mouth daily      Facility-Administered Medications: None       History reviewed. No pertinent past medical history. Past Surgical History:   Procedure Laterality Date    DENTAL SURGERY      TONSILECTOMY AND ADNOIDECTOMY         Family History   Problem Relation Age of Onset    Lupus Mother     Leukemia Mother     Heart murmur Mother     Panic disorder Father      I have reviewed and agree with the history as documented. E-Cigarette/Vaping    E-Cigarette Use Never User      E-Cigarette/Vaping Substances    Nicotine No     THC No     CBD No     Flavoring No     Other No     Unknown No      Social History     Tobacco Use    Smoking status: Never    Smokeless tobacco: Never   Vaping Use    Vaping Use: Never used   Substance Use Topics    Alcohol use: Not Currently    Drug use: Not Currently     Types: Marijuana       Review of Systems   Constitutional:  Negative for appetite change, chills, fatigue and fever. HENT:  Positive for ear discharge and ear pain.  Negative for congestion, rhinorrhea and sore throat. Eyes:  Negative for photophobia, discharge, redness, itching and visual disturbance. Respiratory:  Negative for cough and shortness of breath. Cardiovascular:  Negative for chest pain and palpitations. Gastrointestinal:  Negative for abdominal pain, nausea and vomiting. Musculoskeletal:  Negative for myalgias, neck pain and neck stiffness. Skin:  Negative for rash. Neurological:  Negative for light-headedness and headaches. Physical Exam  Physical Exam  Vitals reviewed. Constitutional:       General: He is not in acute distress. Appearance: Normal appearance. He is not ill-appearing, toxic-appearing or diaphoretic. HENT:      Head: Normocephalic and atraumatic. Right Ear: External ear normal. No swelling or tenderness. No foreign body. No mastoid tenderness. No hemotympanum. Tympanic membrane is injected and erythematous. Tympanic membrane is not perforated or bulging. Left Ear: Tympanic membrane, ear canal and external ear normal.      Nose: Nose normal.      Mouth/Throat:      Mouth: Mucous membranes are moist.      Pharynx: Oropharynx is clear. No oropharyngeal exudate or posterior oropharyngeal erythema. Eyes:      General: No scleral icterus. Right eye: No discharge. Left eye: No discharge. Extraocular Movements: Extraocular movements intact. Conjunctiva/sclera: Conjunctivae normal.   Cardiovascular:      Rate and Rhythm: Normal rate and regular rhythm. Pulses: Normal pulses. Pulmonary:      Effort: Pulmonary effort is normal. No respiratory distress. Musculoskeletal:         General: Normal range of motion. Cervical back: Normal range of motion and neck supple. No rigidity or tenderness. Lymphadenopathy:      Cervical: No cervical adenopathy. Skin:     General: Skin is warm and dry. Capillary Refill: Capillary refill takes less than 2 seconds.    Neurological:      General: No focal deficit present. Mental Status: He is alert. Psychiatric:         Mood and Affect: Mood normal.         Behavior: Behavior normal.         Vital Signs  ED Triage Vitals [10/26/23 1344]   Temperature Pulse Respirations Blood Pressure SpO2   97.7 °F (36.5 °C) 72 16 130/96 97 %      Temp Source Heart Rate Source Patient Position - Orthostatic VS BP Location FiO2 (%)   Temporal -- -- -- --      Pain Score       6           Vitals:    10/26/23 1344   BP: 130/96   Pulse: 72         Visual Acuity      ED Medications  Medications - No data to display    Diagnostic Studies  Results Reviewed       None                   No orders to display              Procedures  Procedures         ED Course                                             Medical Decision Making  26year old male presenting with right sided ear pain with possible blood on q tip. The right ear TM is consistent with otitis media, will treat with Augmentin. Believe the blood he saw is dark wax as there is no blood or granulation tissue in the canal, and TM is intact. Discussed supportive care. Pt stable at time of discharge, vital signs reviewed, questions answered. Strict ER return precautions provided/discussed and were well understood by patient. Problems Addressed:  Non-recurrent acute suppurative otitis media of right ear without spontaneous rupture of tympanic membrane: acute illness or injury    Risk  Prescription drug management.              Disposition  Final diagnoses:   Non-recurrent acute suppurative otitis media of right ear without spontaneous rupture of tympanic membrane     Time reflects when diagnosis was documented in both MDM as applicable and the Disposition within this note       Time User Action Codes Description Comment    10/26/2023  2:02 PM Renate Daniels Add [H66.001] Non-recurrent acute suppurative otitis media of right ear without spontaneous rupture of tympanic membrane           ED Disposition       ED Disposition Discharge    Condition   Stable    Date/Time   Thu Oct 26, 2023  2:03 PM    Comment   Karla Fairly discharge to home/self care. Follow-up Information       Follow up With Specialties Details Why Contact Info Additional [address] [facility] Emergency Department Emergency Medicine Go to  If symptoms worsen [address] Emergency Department, [address] [place] [address]            Discharge Medication List as of 10/26/2023  2:04 PM        START taking these medications    Details   amoxicillin-clavulanate (AUGMENTIN) 875-125 mg per tablet Take 1 tablet by mouth every 12 (twelve) hours for 7 days, Starting Thu 10/26/2023, Until Thu 11/2/2023, Normal           CONTINUE these medications which have NOT CHANGED    Details   atoMOXetine (STRATTERA) 40 mg capsule Take 1 capsule (40 mg total) by mouth daily, Starting Wed 3/22/2023, Normal      fluticasone (FLONASE) 50 mcg/act nasal spray 1 spray into each nostril daily, Starting Wed 12/23/2020, Normal      loratadine (CLARITIN) 10 mg tablet Take 1 tablet (10 mg total) by mouth daily, Starting Wed 12/23/2020, Until Sun 9/19/2021, Normal             No discharge procedures on file.     PDMP Review       None            ED Provider  Electronically Signed by             Carlos Tucker PA-C  10/26/23 4062

## 2023-10-29 NOTE — ED ATTENDING ATTESTATION
Patient was seen by the physician assistant. I was present in the ED during evaluation, and available for consultation. Chart reviewed and agree with the disposition and plan as outlined above. No

## 2025-05-27 ENCOUNTER — HOSPITAL ENCOUNTER (EMERGENCY)
Facility: HOSPITAL | Age: 25
Discharge: HOME | End: 2025-05-27
Attending: EMERGENCY MEDICINE | Admitting: EMERGENCY MEDICINE
Payer: COMMERCIAL

## 2025-05-27 VITALS
SYSTOLIC BLOOD PRESSURE: 143 MMHG | DIASTOLIC BLOOD PRESSURE: 77 MMHG | TEMPERATURE: 98.1 F | RESPIRATION RATE: 19 BRPM | HEART RATE: 106 BPM | OXYGEN SATURATION: 98 %

## 2025-05-27 DIAGNOSIS — T69.9XXA COLD EXPOSURE, INITIAL ENCOUNTER: Primary | ICD-10-CM

## 2025-05-27 PROCEDURE — 99283 EMERGENCY DEPT VISIT LOW MDM: CPT | Performed by: EMERGENCY MEDICINE

## 2025-05-27 PROCEDURE — 99283 EMERGENCY DEPT VISIT LOW MDM: CPT
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
## 2025-05-28 ENCOUNTER — OFFICE VISIT (OUTPATIENT)
Dept: INTERNAL MEDICINE CLINIC | Facility: CLINIC | Age: 25
End: 2025-05-28
Payer: COMMERCIAL

## 2025-05-28 ENCOUNTER — APPOINTMENT (OUTPATIENT)
Dept: LAB | Age: 25
End: 2025-05-28
Attending: INTERNAL MEDICINE
Payer: COMMERCIAL

## 2025-05-28 VITALS
HEIGHT: 65 IN | SYSTOLIC BLOOD PRESSURE: 118 MMHG | WEIGHT: 178.4 LBS | TEMPERATURE: 98.7 F | OXYGEN SATURATION: 98 % | HEART RATE: 96 BPM | BODY MASS INDEX: 29.72 KG/M2 | DIASTOLIC BLOOD PRESSURE: 76 MMHG

## 2025-05-28 DIAGNOSIS — R10.84 GENERALIZED ABDOMINAL PAIN: ICD-10-CM

## 2025-05-28 DIAGNOSIS — G47.33 OSA (OBSTRUCTIVE SLEEP APNEA): ICD-10-CM

## 2025-05-28 DIAGNOSIS — R35.0 URINARY FREQUENCY: ICD-10-CM

## 2025-05-28 DIAGNOSIS — R10.84 GENERALIZED ABDOMINAL PAIN: Primary | ICD-10-CM

## 2025-05-28 DIAGNOSIS — E78.2 MIXED HYPERLIPIDEMIA: ICD-10-CM

## 2025-05-28 DIAGNOSIS — R73.01 IMPAIRED FASTING BLOOD SUGAR: ICD-10-CM

## 2025-05-28 DIAGNOSIS — Z00.00 ROUTINE GENERAL MEDICAL EXAMINATION AT A HEALTH CARE FACILITY: ICD-10-CM

## 2025-05-28 DIAGNOSIS — J30.1 NON-SEASONAL ALLERGIC RHINITIS DUE TO POLLEN: ICD-10-CM

## 2025-05-28 DIAGNOSIS — F90.2 ATTENTION DEFICIT HYPERACTIVITY DISORDER (ADHD), COMBINED TYPE: ICD-10-CM

## 2025-05-28 LAB
ALBUMIN SERPL BCG-MCNC: 4.7 G/DL (ref 3.5–5)
ALP SERPL-CCNC: 80 U/L (ref 34–104)
ALT SERPL W P-5'-P-CCNC: 41 U/L (ref 7–52)
ANION GAP SERPL CALCULATED.3IONS-SCNC: 9 MMOL/L (ref 4–13)
AST SERPL W P-5'-P-CCNC: 33 U/L (ref 13–39)
BACTERIA UR QL AUTO: ABNORMAL /HPF
BASOPHILS # BLD AUTO: 0.04 THOUSANDS/ÂΜL (ref 0–0.1)
BASOPHILS NFR BLD AUTO: 1 % (ref 0–1)
BILIRUB SERPL-MCNC: 0.81 MG/DL (ref 0.2–1)
BILIRUB UR QL STRIP: NEGATIVE
BUN SERPL-MCNC: 13 MG/DL (ref 5–25)
CALCIUM SERPL-MCNC: 9.6 MG/DL (ref 8.4–10.2)
CHLORIDE SERPL-SCNC: 103 MMOL/L (ref 96–108)
CHOLEST SERPL-MCNC: 215 MG/DL (ref ?–200)
CLARITY UR: CLEAR
CO2 SERPL-SCNC: 30 MMOL/L (ref 21–32)
COLOR UR: ABNORMAL
CREAT SERPL-MCNC: 0.81 MG/DL (ref 0.6–1.3)
EOSINOPHIL # BLD AUTO: 0.09 THOUSAND/ÂΜL (ref 0–0.61)
EOSINOPHIL NFR BLD AUTO: 1 % (ref 0–6)
ERYTHROCYTE [DISTWIDTH] IN BLOOD BY AUTOMATED COUNT: 12.2 % (ref 11.6–15.1)
EST. AVERAGE GLUCOSE BLD GHB EST-MCNC: 117 MG/DL
GFR SERPL CREATININE-BSD FRML MDRD: 126 ML/MIN/1.73SQ M
GLUCOSE P FAST SERPL-MCNC: 75 MG/DL (ref 65–99)
GLUCOSE UR STRIP-MCNC: NEGATIVE MG/DL
HBA1C MFR BLD: 5.7 %
HCT VFR BLD AUTO: 47.5 % (ref 36.5–49.3)
HDLC SERPL-MCNC: 30 MG/DL
HGB BLD-MCNC: 16 G/DL (ref 12–17)
HGB UR QL STRIP.AUTO: NEGATIVE
IMM GRANULOCYTES # BLD AUTO: 0.05 THOUSAND/UL (ref 0–0.2)
IMM GRANULOCYTES NFR BLD AUTO: 1 % (ref 0–2)
KETONES UR STRIP-MCNC: NEGATIVE MG/DL
LDLC SERPL CALC-MCNC: 120 MG/DL (ref 0–100)
LEUKOCYTE ESTERASE UR QL STRIP: NEGATIVE
LYMPHOCYTES # BLD AUTO: 2.82 THOUSANDS/ÂΜL (ref 0.6–4.47)
LYMPHOCYTES NFR BLD AUTO: 39 % (ref 14–44)
MCH RBC QN AUTO: 30.2 PG (ref 26.8–34.3)
MCHC RBC AUTO-ENTMCNC: 33.7 G/DL (ref 31.4–37.4)
MCV RBC AUTO: 90 FL (ref 82–98)
MONOCYTES # BLD AUTO: 0.51 THOUSAND/ÂΜL (ref 0.17–1.22)
MONOCYTES NFR BLD AUTO: 7 % (ref 4–12)
NEUTROPHILS # BLD AUTO: 3.69 THOUSANDS/ÂΜL (ref 1.85–7.62)
NEUTS SEG NFR BLD AUTO: 51 % (ref 43–75)
NITRITE UR QL STRIP: NEGATIVE
NON-SQ EPI CELLS URNS QL MICRO: ABNORMAL /HPF
NRBC BLD AUTO-RTO: 0 /100 WBCS
PH UR STRIP.AUTO: 7.5 [PH]
PLATELET # BLD AUTO: 197 THOUSANDS/UL (ref 149–390)
PMV BLD AUTO: 12.9 FL (ref 8.9–12.7)
POTASSIUM SERPL-SCNC: 4.4 MMOL/L (ref 3.5–5.3)
PROT SERPL-MCNC: 7.8 G/DL (ref 6.4–8.4)
PROT UR STRIP-MCNC: ABNORMAL MG/DL
RBC # BLD AUTO: 5.3 MILLION/UL (ref 3.88–5.62)
RBC #/AREA URNS AUTO: ABNORMAL /HPF
SODIUM SERPL-SCNC: 142 MMOL/L (ref 135–147)
SP GR UR STRIP.AUTO: 1.03 (ref 1–1.03)
TRIGL SERPL-MCNC: 327 MG/DL (ref ?–150)
TSH SERPL DL<=0.05 MIU/L-ACNC: 2.35 UIU/ML (ref 0.45–4.5)
UROBILINOGEN UR STRIP-ACNC: <2 MG/DL
WBC # BLD AUTO: 7.2 THOUSAND/UL (ref 4.31–10.16)
WBC #/AREA URNS AUTO: ABNORMAL /HPF

## 2025-05-28 PROCEDURE — 84443 ASSAY THYROID STIM HORMONE: CPT

## 2025-05-28 PROCEDURE — 81001 URINALYSIS AUTO W/SCOPE: CPT

## 2025-05-28 PROCEDURE — 99214 OFFICE O/P EST MOD 30 MIN: CPT | Performed by: INTERNAL MEDICINE

## 2025-05-28 PROCEDURE — 36415 COLL VENOUS BLD VENIPUNCTURE: CPT

## 2025-05-28 PROCEDURE — 99395 PREV VISIT EST AGE 18-39: CPT | Performed by: INTERNAL MEDICINE

## 2025-05-28 PROCEDURE — 85025 COMPLETE CBC W/AUTO DIFF WBC: CPT

## 2025-05-28 PROCEDURE — 83036 HEMOGLOBIN GLYCOSYLATED A1C: CPT

## 2025-05-28 PROCEDURE — 80053 COMPREHEN METABOLIC PANEL: CPT

## 2025-05-28 PROCEDURE — 80061 LIPID PANEL: CPT

## 2025-05-28 RX ORDER — LORATADINE 10 MG/1
10 TABLET ORAL DAILY
Qty: 90 TABLET | Refills: 2 | Status: SHIPPED | OUTPATIENT
Start: 2025-05-28 | End: 2026-02-22

## 2025-05-28 RX ORDER — ATOMOXETINE 40 MG/1
40 CAPSULE ORAL DAILY
Qty: 30 CAPSULE | Refills: 0 | Status: SHIPPED | OUTPATIENT
Start: 2025-05-28

## 2025-05-28 RX ORDER — FLUTICASONE PROPIONATE 50 MCG
1 SPRAY, SUSPENSION (ML) NASAL DAILY
Qty: 16 G | Refills: 5 | Status: SHIPPED | OUTPATIENT
Start: 2025-05-28

## 2025-05-28 NOTE — ED ATTENDING ATTESTATION
5/27/2025  I, Horacio Leggett MD, saw and evaluated the patient. I have discussed the patient with the resident/non-physician practitioner and agree with the resident's/non-physician practitioner's findings, Plan of Care, and MDM as documented in the resident's/non-physician practitioner's note, except where noted. All available labs and Radiology studies were reviewed.  I was present for key portions of any procedure(s) performed by the resident/non-physician practitioner and I was immediately available to provide assistance.       At this point I agree with the current assessment done in the Emergency Department.  I have conducted an independent evaluation of this patient a history and physical is as follows:    ED Course  ED Course as of 05/27/25 8585   Tue May 27, 2025   0221 Patient has been seen and examined by me.  Patient is definitely cold but not hypothermic by clinical criteria.  Mildly tachycardic.  Clinically well.  We will warm them up in the ER watch him for 1/2-hour and then discharge         Critical Care Time  Procedures

## 2025-05-28 NOTE — PROGRESS NOTES
Adult Annual Physical  Name: Karishma López      : 2000      MRN: [id number]  Encounter Provider: Yara Headley MD  Encounter Date: 2025   Encounter department: [facility] INTERNAL MEDICINE    :  Assessment & Plan  Generalized abdominal pain    Orders:  •  CBC and differential; Future  •  Comprehensive metabolic panel; Future  •  Lipid Panel with Direct LDL reflex; Future  •  TSH, 3rd generation; Future  •  Urinalysis with microscopic; Future  •  US abdomen complete; Future    Attention deficit hyperactivity disorder (ADHD), combined type    Orders:  •  atoMOXetine (STRATTERA) 40 mg capsule; Take 1 capsule (40 mg total) by mouth daily    Non-seasonal allergic rhinitis due to pollen    Orders:  •  fluticasone (FLONASE) 50 mcg/act nasal spray; 1 spray into each nostril daily  •  loratadine (CLARITIN) 10 mg tablet; Take 1 tablet (10 mg total) by mouth daily    Mixed hyperlipidemia    Orders:  •  Comprehensive metabolic panel; Future  •  Lipid Panel with Direct LDL reflex; Future  •  TSH, 3rd generation; Future    Impaired fasting blood sugar    Orders:  •  Hemoglobin A1C; Future    Urinary frequency    Orders:  •  Urinalysis with microscopic; Future    HALEY (obstructive sleep apnea)    Orders:  •  Ambulatory Referral to Sleep Medicine; Future    Routine general medical examination at a health care facility             Preventive Screenings:    - Prostate cancer screening: screening not indicated     Immunizations:  - Immunizations due: Tdap and HPV (Gardasil 9)         History of Present Illness     Adult Annual Physical:  Patient presents for annual physical. physical.     Diet and Physical Activity:  - Diet/Nutrition: well balanced diet.  - Exercise: moderate cardiovascular exercise.    Depression Screening:  - PHQ-2 Score: 0    General Health:  - Sleep: sleeps well.  - Hearing: normal hearing bilateral ears.  - Vision: no vision problems.  - Dental: no dental visits for > 1 year.      "Health:  - History of STDs: no.   - Urinary symptoms: none.     Advanced Care Planning:  - Has an advanced directive?: no    - Has a durable medical POA?: no    - ACP document given to patient?: yes      Review of Systems   Constitutional:  Negative for chills and fever.   HENT:  Negative for congestion, ear pain and sore throat.    Eyes:  Negative for pain.   Respiratory:  Negative for cough and shortness of breath.    Cardiovascular:  Negative for chest pain and leg swelling.   Gastrointestinal:  Negative for abdominal pain, nausea and vomiting.   Endocrine: Negative for polyuria.   Genitourinary:  Negative for difficulty urinating, frequency and urgency.   Musculoskeletal:  Negative for arthralgias and back pain.   Skin:  Negative for rash.   Neurological:  Negative for weakness and headaches.   Psychiatric/Behavioral:  Negative for sleep disturbance. The patient is not nervous/anxious.          Objective   /76 (BP Location: Left arm, Patient Position: Sitting, Cuff Size: Standard)   Pulse 96   Temp 98.7 °F (37.1 °C) (Temporal)   Ht 5' 4.5\" (1.638 m)   Wt 80.9 kg (178 lb 6.4 oz)   SpO2 98%   BMI 30.15 kg/m²     Physical Exam  Vitals and nursing note reviewed.   Constitutional:       General: He is not in acute distress.     Appearance: He is well-developed.   HENT:      Head: Normocephalic and atraumatic.      Right Ear: Tympanic membrane, ear canal and external ear normal.      Left Ear: Tympanic membrane, ear canal and external ear normal.      Mouth/Throat:      Mouth: Mucous membranes are moist.      Pharynx: Oropharynx is clear.     Eyes:      Extraocular Movements: Extraocular movements intact.      Conjunctiva/sclera: Conjunctivae normal.       Cardiovascular:      Rate and Rhythm: Normal rate and regular rhythm.      Heart sounds: Normal heart sounds. No murmur heard.  Pulmonary:      Effort: Pulmonary effort is normal. No respiratory distress.      Breath sounds: Normal breath sounds. No " wheezing or rales.   Abdominal:      General: Abdomen is flat. There is no distension.      Palpations: Abdomen is soft.      Tenderness: There is no abdominal tenderness.     Musculoskeletal:         General: No swelling.      Cervical back: Neck supple.      Right lower leg: No edema.      Left lower leg: No edema.     Skin:     General: Skin is warm and dry.      Capillary Refill: Capillary refill takes less than 2 seconds.     Neurological:      General: No focal deficit present.      Mental Status: He is alert and oriented to person, place, and time.     Psychiatric:         Mood and Affect: Mood normal.
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
## 2025-05-29 ENCOUNTER — RESULTS FOLLOW-UP (OUTPATIENT)
Dept: INTERNAL MEDICINE CLINIC | Facility: CLINIC | Age: 25
End: 2025-05-29

## 2025-05-29 ENCOUNTER — TELEPHONE (OUTPATIENT)
Dept: SLEEP CENTER | Facility: CLINIC | Age: 25
End: 2025-05-29

## 2025-05-29 ENCOUNTER — TRANSCRIBE ORDERS (OUTPATIENT)
Dept: SLEEP CENTER | Facility: CLINIC | Age: 25
End: 2025-05-29

## 2025-05-29 DIAGNOSIS — G47.33 OSA (OBSTRUCTIVE SLEEP APNEA): Primary | ICD-10-CM

## 2025-06-04 ENCOUNTER — TELEPHONE (OUTPATIENT)
Age: 25
End: 2025-06-04

## 2025-06-04 NOTE — TELEPHONE ENCOUNTER
PA for atoMOXetine (STRATTERA) 40 mg capsule APPROVED     Date(s) approved 06/01/25-06/04/26    Case # N/A    Patient advised by          [][employer] Message  [x]Phone call   [x]LMOM  []L/M to call office as no active Communication consent on file  []Unable to leave detailed message as VM not approved on Communication consent       Pharmacy advised by    [x]Fax  []Phone call  []Secure Chat     Approval letter scanned into Media Yes

## 2025-06-04 NOTE — TELEPHONE ENCOUNTER
PA for atoMOXetine (STRATTERA) 40 mg capsule SUBMITTED to [employer]    via    [x]CMM-KEY: [id number]  []Surescripts-Case ID #   []Availity-Auth ID # NDC #   []Faxed to plan   []Other website   []Phone call Case ID #     [x]PA sent as URGENT    All office notes, labs and other pertaining documents and studies sent. Clinical questions answered. Awaiting determination from insurance company.     Turnaround time for your insurance to make a decision on your Prior Authorization can take 7-21 business days.

## 2025-06-04 NOTE — TELEPHONE ENCOUNTER
Medication:     atoMOXetine (STRATTERA) 40 mg capsule       Prior authorization: Yes    Pharmacy: [facility] - [place], [place] - [address]      Office:   [x] PCP/Provider -   [] Speciality/Provider -     Does the patient have enough for 3 days?   [x] Yes   [] No - Send as HP to POD

## 2025-06-10 ENCOUNTER — OFFICE VISIT (OUTPATIENT)
Dept: INTERNAL MEDICINE CLINIC | Facility: CLINIC | Age: 25
End: 2025-06-10
Payer: COMMERCIAL

## 2025-06-10 VITALS
TEMPERATURE: 98 F | OXYGEN SATURATION: 99 % | WEIGHT: 177 LBS | SYSTOLIC BLOOD PRESSURE: 122 MMHG | BODY MASS INDEX: 29.49 KG/M2 | DIASTOLIC BLOOD PRESSURE: 84 MMHG | HEIGHT: 65 IN | HEART RATE: 80 BPM

## 2025-06-10 DIAGNOSIS — F90.2 ATTENTION DEFICIT HYPERACTIVITY DISORDER (ADHD), COMBINED TYPE: ICD-10-CM

## 2025-06-10 DIAGNOSIS — R21 RASH: ICD-10-CM

## 2025-06-10 DIAGNOSIS — J30.1 SEASONAL ALLERGIC RHINITIS DUE TO POLLEN: Primary | ICD-10-CM

## 2025-06-10 PROCEDURE — 99214 OFFICE O/P EST MOD 30 MIN: CPT | Performed by: INTERNAL MEDICINE

## 2025-06-10 NOTE — PROGRESS NOTES
"Name: Karishma López      : 2000      MRN: [id number]  Encounter Provider: Yara Headley MD  Encounter Date: 6/10/2025   Encounter department: [facility] INTERNAL MEDICINE  :  Assessment & Plan  Seasonal allergic rhinitis due to pollen  Continue same med       Rash    Orders:  •  Hepatitis C antibody; Future  •  HIV 1/2 AG/AB w Reflex [facility] for 2 yr old and above; Future  •  Hepatitis B surface antigen; Future  •  RPR-Syphilis Screening (Total Syphilis IGG/IGM); Future  •  Chlamydia/N. gonorrhoeae RNA, TMA; Future    Attention deficit hyperactivity disorder (ADHD), combined type  Continue same med              History of Present Illness   Follow-up on blood test on 2025 test discussed with him      Review of Systems   Constitutional:  Negative for chills and fever.   HENT:  Negative for congestion, ear pain and sore throat.    Eyes:  Negative for pain.   Respiratory:  Negative for cough and shortness of breath.    Cardiovascular:  Negative for chest pain and leg swelling.   Gastrointestinal:  Negative for abdominal pain, nausea and vomiting.   Endocrine: Negative for polyuria.   Genitourinary:  Negative for difficulty urinating, frequency and urgency.   Musculoskeletal:  Negative for arthralgias and back pain.   Skin:  Negative for rash.   Neurological:  Negative for weakness and headaches.   Psychiatric/Behavioral:  Negative for sleep disturbance. The patient is not nervous/anxious.        Objective   /84 (BP Location: Left arm, Patient Position: Sitting, Cuff Size: Standard)   Pulse 80   Temp 98 °F (36.7 °C) (Temporal)   Ht 5' 4.5\" (1.638 m)   Wt 80.3 kg (177 lb)   SpO2 99%   BMI 29.91 kg/m²      Physical Exam  Vitals and nursing note reviewed.   Constitutional:       General: He is not in acute distress.     Appearance: He is well-developed.   HENT:      Head: Normocephalic and atraumatic.      Right Ear: External ear normal.      Left Ear: External ear normal.      " Mouth/Throat:      Mouth: Mucous membranes are moist.      Pharynx: Oropharynx is clear.     Eyes:      Extraocular Movements: Extraocular movements intact.      Conjunctiva/sclera: Conjunctivae normal.       Cardiovascular:      Rate and Rhythm: Normal rate and regular rhythm.      Heart sounds: Normal heart sounds. No murmur heard.  Pulmonary:      Effort: Pulmonary effort is normal. No respiratory distress.      Breath sounds: Normal breath sounds. No wheezing or rales.   Abdominal:      General: Abdomen is flat. There is no distension.      Palpations: Abdomen is soft.      Tenderness: There is no abdominal tenderness.     Musculoskeletal:         General: No swelling.      Cervical back: Neck supple.      Right lower leg: No edema.      Left lower leg: No edema.     Skin:     General: Skin is warm and dry.      Capillary Refill: Capillary refill takes less than 2 seconds.     Neurological:      General: No focal deficit present.      Mental Status: He is alert and oriented to person, place, and time.     Psychiatric:         Mood and Affect: Mood normal.